# Patient Record
Sex: FEMALE | Race: WHITE | NOT HISPANIC OR LATINO | URBAN - METROPOLITAN AREA
[De-identification: names, ages, dates, MRNs, and addresses within clinical notes are randomized per-mention and may not be internally consistent; named-entity substitution may affect disease eponyms.]

---

## 2023-09-18 ENCOUNTER — OFFICE (OUTPATIENT)
Dept: URBAN - METROPOLITAN AREA CLINIC 67 | Facility: CLINIC | Age: 23
End: 2023-09-18

## 2023-09-18 VITALS
DIASTOLIC BLOOD PRESSURE: 74 MMHG | WEIGHT: 185.6 LBS | HEART RATE: 76 BPM | HEIGHT: 70 IN | SYSTOLIC BLOOD PRESSURE: 122 MMHG | OXYGEN SATURATION: 99 %

## 2023-09-18 DIAGNOSIS — R11.2 NAUSEA WITH VOMITING, UNSPECIFIED: ICD-10-CM

## 2023-09-18 PROCEDURE — 99203 OFFICE O/P NEW LOW 30 MIN: CPT | Performed by: INTERNAL MEDICINE

## 2023-09-18 RX ORDER — OMEPRAZOLE 40 MG/1
CAPSULE, DELAYED RELEASE ORAL
Qty: 90 | Refills: 1 | Status: ACTIVE
Start: 2023-09-18

## 2023-09-18 NOTE — SERVICENOTES
I will check her tTG IgA Ab titer and place her on a trial of a daily PPI to ensure her symptoms are not a manifestation of celiac disease or reflux/GERD, respectively. I have also asked her to abstain from all cannabis use for the next 4-6 weeks as a cannabis-induced cyclic vomiting/hyperemesis syndrome would be a consideration.

## 2023-09-18 NOTE — SERVICEHPINOTES
Aye Meneses   is seen for an initial visit today.
lesli Ibrahimhe reports a 14 month history of persistent nausea/vomiting that occurs daily - it can happen anytime throughout the day without an obvious relationship to eating but she has noted mild improvement recently with eliminating diary from her diet. 
br
lesli She reports associated globus sensation but denies any GI tract bleeding symptoms or dysphagia - she reports a 20-lb weight loss in the past 6 months but it has been somewhat purposeful. 
br She has also seen an ENT and allergist with the thought that her symptoms could be due to environmental allergies - she has been started on once daily Cetirizine but her symptoms persist. Of note, she does use MJ/Cannabis several times per week.lesli ballesteros An abdominal ultrasound done in 8/2022 was unremarkable and her CBC was normal at that time - her TSH and T4 were normal on 9/7/23.

## 2023-11-02 ENCOUNTER — TELEHEALTH PROVIDED OTHER THAN IN PATIENT'S HOME (OUTPATIENT)
Dept: URBAN - METROPOLITAN AREA CLINIC 67 | Facility: CLINIC | Age: 23
End: 2023-11-02

## 2023-11-02 VITALS — HEIGHT: 70 IN

## 2023-11-02 DIAGNOSIS — R11.2 NAUSEA WITH VOMITING, UNSPECIFIED: ICD-10-CM

## 2023-11-02 PROCEDURE — 99213 OFFICE O/P EST LOW 20 MIN: CPT | Mod: 95 | Performed by: NURSE PRACTITIONER

## 2023-11-02 RX ORDER — OMEPRAZOLE 40 MG/1
CAPSULE, DELAYED RELEASE ORAL
Qty: 90 | Refills: 1 | Status: ACTIVE
Start: 2023-09-18

## 2024-05-23 ENCOUNTER — OFFICE (OUTPATIENT)
Dept: URBAN - METROPOLITAN AREA CLINIC 67 | Facility: CLINIC | Age: 24
End: 2024-05-23

## 2024-05-23 VITALS
DIASTOLIC BLOOD PRESSURE: 76 MMHG | WEIGHT: 182 LBS | HEIGHT: 70 IN | HEART RATE: 108 BPM | SYSTOLIC BLOOD PRESSURE: 120 MMHG

## 2024-05-23 DIAGNOSIS — R11.2 NAUSEA WITH VOMITING, UNSPECIFIED: ICD-10-CM

## 2024-05-23 PROCEDURE — 99213 OFFICE O/P EST LOW 20 MIN: CPT | Performed by: INTERNAL MEDICINE

## 2024-05-23 NOTE — SERVICENOTES
We discussed checking a gastric emptying study to exclude delayed emptying as an etiology for her ongoing symptoms - if that is normal, consideration will be given to an EGD to exclude any mechanical upper GI etiology for her ongoing symptoms.
In the meantime, she will remain on 40mg/day of Omeprazole.

## 2024-05-23 NOTE — SERVICEHPINOTES
Aye Meneses   is seen today for a follow-up visit   regarding chronic nausea with vomiting. An abdominal ultrasound done in 8/2022 was unremarkable and her CBC was normal at that time - her TSH/T4 and tTG IgA Ab titer were normal in 9/2023.lesliHer symptoms have improved with starting Omeprazole 40mg/day and today she relates that her symptoms worsened with trying to go down on her Omeprazole to 20mg/day. She went back up to 40mg/day but reports that she is still experiencing episodes of nausea/vomiting that seem to occur more often with going outside/physical exertion or if she is fasting. She notices some nausea with eating but it's not as bad as compared to what she experiences with exertion or when fasting. 
br She denies any GI tract bleeding symptoms and her weight has been stable.